# Patient Record
Sex: MALE | Race: BLACK OR AFRICAN AMERICAN | NOT HISPANIC OR LATINO
[De-identification: names, ages, dates, MRNs, and addresses within clinical notes are randomized per-mention and may not be internally consistent; named-entity substitution may affect disease eponyms.]

---

## 2017-12-20 ENCOUNTER — RX ONLY (OUTPATIENT)
Age: 62
Setting detail: RX ONLY
End: 2017-12-20

## 2017-12-20 RX ORDER — FLUOCINOLONE ACETONIDE 0.11 MG/ML
OIL TOPICAL BID
Qty: 1 | Refills: 1 | Status: ERX

## 2018-01-05 ENCOUNTER — APPOINTMENT (RX ONLY)
Dept: URBAN - METROPOLITAN AREA CLINIC 44 | Facility: CLINIC | Age: 63
Setting detail: DERMATOLOGY
End: 2018-01-05

## 2018-01-05 ENCOUNTER — APPOINTMENT (RX ONLY)
Dept: URBAN - METROPOLITAN AREA CLINIC 45 | Facility: CLINIC | Age: 63
Setting detail: DERMATOLOGY
End: 2018-01-05

## 2018-01-05 DIAGNOSIS — L21.8 OTHER SEBORRHEIC DERMATITIS: ICD-10-CM

## 2018-01-05 PROBLEM — E13.9 OTHER SPECIFIED DIABETES MELLITUS WITHOUT COMPLICATIONS: Status: ACTIVE | Noted: 2018-01-05

## 2018-01-05 PROBLEM — K21.9 GASTRO-ESOPHAGEAL REFLUX DISEASE WITHOUT ESOPHAGITIS: Status: ACTIVE | Noted: 2018-01-05

## 2018-01-05 PROBLEM — M12.9 ARTHROPATHY, UNSPECIFIED: Status: ACTIVE | Noted: 2018-01-05

## 2018-01-05 PROBLEM — L20.84 INTRINSIC (ALLERGIC) ECZEMA: Status: ACTIVE | Noted: 2018-01-05

## 2018-01-05 PROBLEM — L29.8 OTHER PRURITUS: Status: ACTIVE | Noted: 2018-01-05

## 2018-01-05 PROBLEM — J30.1 ALLERGIC RHINITIS DUE TO POLLEN: Status: ACTIVE | Noted: 2018-01-05

## 2018-01-05 PROBLEM — I10 ESSENTIAL (PRIMARY) HYPERTENSION: Status: ACTIVE | Noted: 2018-01-05

## 2018-01-05 PROCEDURE — ? MEDICATION COUNSELING

## 2018-01-05 PROCEDURE — ? TREATMENT REGIMEN

## 2018-01-05 PROCEDURE — ? COUNSELING

## 2018-01-05 PROCEDURE — 99212 OFFICE O/P EST SF 10 MIN: CPT

## 2018-01-05 PROCEDURE — ? PRESCRIPTION

## 2018-01-05 RX ORDER — PIMECROLIMUS 10 MG/G
CREAM TOPICAL ONCE DAILY
Qty: 1 | Refills: 2 | Status: ERX

## 2018-01-05 RX ORDER — KETOCONAZOLE 20.5 MG/ML
SHAMPOO, SUSPENSION TOPICAL 3 TIMES WEEKLY
Qty: 1 | Refills: 2 | Status: ERX | COMMUNITY
Start: 2018-01-05

## 2018-01-05 RX ADMIN — KETOCONAZOLE THIN LAYER: 20.5 SHAMPOO, SUSPENSION TOPICAL at 00:00

## 2018-01-05 ASSESSMENT — LOCATION DETAILED DESCRIPTION DERM
LOCATION DETAILED: RIGHT CENTRAL POSTAURICULAR SKIN
LOCATION DETAILED: LEFT CENTRAL POSTAURICULAR SKIN
LOCATION DETAILED: LEFT CENTRAL POSTAURICULAR SKIN
LOCATION DETAILED: RIGHT CENTRAL POSTAURICULAR SKIN

## 2018-01-05 ASSESSMENT — LOCATION SIMPLE DESCRIPTION DERM
LOCATION SIMPLE: SCALP
LOCATION SIMPLE: SCALP

## 2018-01-05 ASSESSMENT — LOCATION ZONE DERM
LOCATION ZONE: SCALP
LOCATION ZONE: SCALP

## 2018-01-05 NOTE — PROCEDURE: MIPS QUALITY
Quality 130: Documentation Of Current Medications In The Medical Record: Current Medications Documented
Quality 111:Pneumonia Vaccination Status For Older Adults: Pneumococcal Vaccination Previously Received
Quality 110: Preventive Care And Screening: Influenza Immunization: Influenza immunization was not ordered or administered, reason not given
Quality 47: Advance Care Plan: Advance care planning not documented, reason not otherwise specified.
Quality 431: Preventive Care And Screening: Unhealthy Alcohol Use - Screening: Patient screened for unhealthy alcohol use using a single question and scores less than 2 times per year
Quality 226: Preventive Care And Screening: Tobacco Use: Screening And Cessation Intervention: Patient screened for tobacco and is an ex-smoker
Detail Level: Detailed

## 2018-01-05 NOTE — PROCEDURE: TREATMENT REGIMEN
Continue Regimen: Derma smooth as needed
Detail Level: Simple
Otc Regimen: CeraVe apply to skin daily for moisturizing
Initiate Treatment: Ketoconazole Shampoo- Apply to scalp as prescribed \\nElidel- Apply to affected areas once daily

## 2018-01-05 NOTE — PROCEDURE: MIPS QUALITY
Quality 47: Advance Care Plan: Advance care planning not documented, reason not otherwise specified.
Quality 226: Preventive Care And Screening: Tobacco Use: Screening And Cessation Intervention: Patient screened for tobacco and is an ex-smoker
Quality 130: Documentation Of Current Medications In The Medical Record: Current Medications Documented
Quality 111:Pneumonia Vaccination Status For Older Adults: Pneumococcal Vaccination Previously Received
Quality 431: Preventive Care And Screening: Unhealthy Alcohol Use - Screening: Patient screened for unhealthy alcohol use using a single question and scores less than 2 times per year
Quality 110: Preventive Care And Screening: Influenza Immunization: Influenza immunization was not ordered or administered, reason not given
Detail Level: Detailed

## 2018-01-05 NOTE — PROCEDURE: TREATMENT REGIMEN
Continue Regimen: Derma smooth as needed
Initiate Treatment: Ketoconazole Shampoo- Apply to scalp as prescribed \\nElidel- Apply to affected areas once daily
Otc Regimen: CeraVe apply to skin daily for moisturizing
Detail Level: Simple

## 2019-10-18 ENCOUNTER — RX ONLY (OUTPATIENT)
Age: 64
Setting detail: RX ONLY
End: 2019-10-18

## 2019-10-18 ENCOUNTER — APPOINTMENT (RX ONLY)
Dept: URBAN - METROPOLITAN AREA CLINIC 45 | Facility: CLINIC | Age: 64
Setting detail: DERMATOLOGY
End: 2019-10-18

## 2019-10-18 ENCOUNTER — APPOINTMENT (RX ONLY)
Dept: URBAN - METROPOLITAN AREA CLINIC 44 | Facility: CLINIC | Age: 64
Setting detail: DERMATOLOGY
End: 2019-10-18

## 2019-10-18 DIAGNOSIS — L21.8 OTHER SEBORRHEIC DERMATITIS: ICD-10-CM | Status: WORSENING

## 2019-10-18 PROBLEM — K21.9 GASTRO-ESOPHAGEAL REFLUX DISEASE WITHOUT ESOPHAGITIS: Status: ACTIVE | Noted: 2019-10-18

## 2019-10-18 PROBLEM — L29.8 OTHER PRURITUS: Status: ACTIVE | Noted: 2019-10-18

## 2019-10-18 PROBLEM — M12.9 ARTHROPATHY, UNSPECIFIED: Status: ACTIVE | Noted: 2019-10-18

## 2019-10-18 PROBLEM — J30.1 ALLERGIC RHINITIS DUE TO POLLEN: Status: ACTIVE | Noted: 2019-10-18

## 2019-10-18 PROBLEM — E13.9 OTHER SPECIFIED DIABETES MELLITUS WITHOUT COMPLICATIONS: Status: ACTIVE | Noted: 2019-10-18

## 2019-10-18 PROBLEM — I10 ESSENTIAL (PRIMARY) HYPERTENSION: Status: ACTIVE | Noted: 2019-10-18

## 2019-10-18 PROBLEM — L20.84 INTRINSIC (ALLERGIC) ECZEMA: Status: ACTIVE | Noted: 2019-10-18

## 2019-10-18 PROCEDURE — 99213 OFFICE O/P EST LOW 20 MIN: CPT

## 2019-10-18 PROCEDURE — ? PRESCRIPTION

## 2019-10-18 PROCEDURE — ? COUNSELING

## 2019-10-18 PROCEDURE — ? TREATMENT REGIMEN

## 2019-10-18 RX ORDER — KETOCONAZOLE 20.5 MG/ML
THIN LAYER SHAMPOO, SUSPENSION TOPICAL 3 TIMES WEEKLY
Qty: 1 | Refills: 2 | Status: ERX

## 2019-10-18 RX ORDER — KETOCONAZOLE 20 MG/G
CREAM TOPICAL BID
Qty: 1 | Refills: 1 | Status: ERX | COMMUNITY
Start: 2019-10-18

## 2019-10-18 RX ADMIN — KETOCONAZOLE THIN LAYER: 20 CREAM TOPICAL at 00:00

## 2019-10-18 ASSESSMENT — LOCATION DETAILED DESCRIPTION DERM
LOCATION DETAILED: LEFT CENTRAL MALAR CHEEK
LOCATION DETAILED: RIGHT CENTRAL MALAR CHEEK
LOCATION DETAILED: LEFT CENTRAL MALAR CHEEK
LOCATION DETAILED: RIGHT CENTRAL MALAR CHEEK

## 2019-10-18 ASSESSMENT — LOCATION SIMPLE DESCRIPTION DERM
LOCATION SIMPLE: RIGHT CHEEK
LOCATION SIMPLE: LEFT CHEEK
LOCATION SIMPLE: RIGHT CHEEK
LOCATION SIMPLE: LEFT CHEEK

## 2019-10-18 ASSESSMENT — LOCATION ZONE DERM
LOCATION ZONE: FACE
LOCATION ZONE: FACE

## 2019-10-18 NOTE — PROCEDURE: TREATMENT REGIMEN
Detail Level: Simple
Otc Regimen: Cerave Genle cleansers
Initiate Treatment: Hydrocortisone 2.5% daily 2 weeks \\nKETOCONAZOLE cream daily
Plan: Discussed using hydrocortisone 2 weeks then use KETOCONAZOLE cream for maintenance. If no improvement call the office for a further treatment plan.
Continue Regimen: KETOCONAZOLE SHAMPOO TIW

## 2019-10-18 NOTE — PROCEDURE: TREATMENT REGIMEN
Otc Regimen: Cerave Genle cleansers
Plan: Discussed using hydrocortisone 2 weeks then use KETOCONAZOLE cream for maintenance. If no improvement call the office for a further treatment plan.
Initiate Treatment: Hydrocortisone 2.5% daily 2 weeks \\nKETOCONAZOLE cream daily
Continue Regimen: KETOCONAZOLE SHAMPOO TIW
Detail Level: Simple

## 2019-10-18 NOTE — PROCEDURE: MIPS QUALITY
Quality 47: Advance Care Plan: Advance Care Planning discussed and documented in the medical record; patient did not wish or was not able to name a surrogate decision maker or provide an advance care plan.
Quality 431: Preventive Care And Screening: Unhealthy Alcohol Use - Screening: Patient screened for unhealthy alcohol use using a single question and scores less than 2 times per year
Quality 110: Preventive Care And Screening: Influenza Immunization: Influenza Immunization Administered during Influenza season
Quality 226: Preventive Care And Screening: Tobacco Use: Screening And Cessation Intervention: Patient screened for tobacco use and is an ex/non-smoker
Quality 111:Pneumonia Vaccination Status For Older Adults: Pneumococcal Vaccination Previously Received
Detail Level: Detailed
Quality 130: Documentation Of Current Medications In The Medical Record: Current Medications Documented

## 2019-10-18 NOTE — PROCEDURE: MIPS QUALITY
Quality 47: Advance Care Plan: Advance Care Planning discussed and documented in the medical record; patient did not wish or was not able to name a surrogate decision maker or provide an advance care plan.
Detail Level: Detailed
Quality 111:Pneumonia Vaccination Status For Older Adults: Pneumococcal Vaccination Previously Received
Quality 110: Preventive Care And Screening: Influenza Immunization: Influenza Immunization Administered during Influenza season
Quality 226: Preventive Care And Screening: Tobacco Use: Screening And Cessation Intervention: Patient screened for tobacco use and is an ex/non-smoker
Quality 130: Documentation Of Current Medications In The Medical Record: Current Medications Documented
Quality 431: Preventive Care And Screening: Unhealthy Alcohol Use - Screening: Patient screened for unhealthy alcohol use using a single question and scores less than 2 times per year